# Patient Record
Sex: FEMALE | Race: WHITE | Employment: OTHER | URBAN - METROPOLITAN AREA
[De-identification: names, ages, dates, MRNs, and addresses within clinical notes are randomized per-mention and may not be internally consistent; named-entity substitution may affect disease eponyms.]

---

## 2017-10-02 ENCOUNTER — HOSPITAL ENCOUNTER (OUTPATIENT)
Dept: MAMMOGRAPHY | Age: 72
Discharge: HOME | End: 2017-10-02
Attending: ORTHOPAEDIC SURGERY
Payer: MEDICARE

## 2017-10-02 DIAGNOSIS — M81.0 OSTEOPOROSIS: ICD-10-CM

## 2017-10-02 PROCEDURE — 77080 DXA BONE DENSITY AXIAL: CPT

## 2017-10-20 ENCOUNTER — HOSPITAL ENCOUNTER (OUTPATIENT)
Dept: MAMMOGRAPHY | Age: 72
Discharge: HOME | End: 2017-10-20
Attending: INTERNAL MEDICINE
Payer: MEDICARE

## 2017-10-20 DIAGNOSIS — Z12.31 VISIT FOR SCREENING MAMMOGRAM: ICD-10-CM

## 2017-10-20 PROCEDURE — 77067 SCR MAMMO BI INCL CAD: CPT

## 2018-10-22 ENCOUNTER — HOSPITAL ENCOUNTER (OUTPATIENT)
Dept: MAMMOGRAPHY | Age: 73
Discharge: HOME | End: 2018-10-22
Attending: INTERNAL MEDICINE
Payer: MEDICARE

## 2018-10-22 DIAGNOSIS — Z12.31 VISIT FOR SCREENING MAMMOGRAM: ICD-10-CM

## 2018-10-22 PROCEDURE — 77067 SCR MAMMO BI INCL CAD: CPT

## 2019-07-22 ENCOUNTER — HOSPITAL ENCOUNTER (OUTPATIENT)
Dept: PHYSICAL THERAPY | Age: 74
Discharge: HOME | End: 2019-07-22
Payer: MEDICARE

## 2019-07-22 PROCEDURE — 97161 PT EVAL LOW COMPLEX 20 MIN: CPT

## 2019-07-22 PROCEDURE — 97035 APP MDLTY 1+ULTRASOUND EA 15: CPT

## 2019-07-22 NOTE — THERAPY EVALUATION
[facility]  :   [place]  Secondary: Rhys Hernandez at [facility] [address], [address], [place] [address].  [phone]   [phone]       OUTPATIENT PHYSICAL THERAPY:Initial Assessment 2019   ICD-10: Treatment Diagnosis: Low back pain (M54.5)  Precautions/Allergies:   Adhesive tape; Hydrochlorothiazide; Iodine; Other medication; and Sulfa (sulfonamide antibiotics)   TREATMENT PLAN:  Effective Dates: 2019 TO 2019 (60 days). Frequency/Duration: 2 times a week for 60 Day(s) MEDICAL/REFERRING DIAGNOSIS:  Lumbago with sciatica, unspecified side [M54.40]   DATE OF ONSET: 2019  REFERRING PHYSICIAN: Francy Carrasco MD MD Orders: Evaluate and treat, dry needling  Return MD Appointment: end      INITIAL ASSESSMENT:  Ms. Huntley presents with complaints of lower back pain after slipping and landing on her back in 2019. She presents with pain, pelvic/sacral obliquity, and decreased activity tolerance due to pain. She will benefit from skilled physical therapy to increase functional mobility and help decrease pain. PROBLEM LIST (Impacting functional limitations):  1. Decreased Strength  2. Decreased ADL/Functional Activities  3. Increased Pain  4. Decreased Activity Tolerance INTERVENTIONS PLANNED: (Treatment may consist of any combination of the following)  1. Manual Therapy  2. Therapeutic Exercise/Strengthening   3. Modalities as needed for pain  4. Therapeutic activities     GOALS: (Goals have been discussed and agreed upon with patient.)  Short term goals: Time frame: 3-4 weeks  1. Pt will report pain at worst 4/10.   2. Pt will be independent with HEP. Discharge Goals: Time Frame: 6-8 weeks  1. Pt will report pain 1/10 with daily activities. 2. Pt will score 25% or less on Oswestry.    3. Pt reports she is able to stand for long periods of time to go shopping with no increase in pain. OUTCOME MEASURE:   Tool Used: Modified Oswestry Low Back Pain Questionnaire  Score:  Initial: 28/50  Most Recent: X/50 (Date: -- )   Interpretation of Score: Each section is scored on a 0-5 scale, 5 representing the greatest disability. The scores of each section are added together for a total score of 50. MEDICAL NECESSITY:   · Patient demonstrates good rehab potential due to higher previous functional level. REASON FOR SERVICES/OTHER COMMENTS:  · Patient continues to require skilled intervention due to increased pain and decreased spinal alignment. Total Duration:  PT Patient Time In/Time Out  Time In: 1355  Time Out: 1455  Rehabilitation Potential For Stated Goals: Good  Regarding [address] [place] [address] therapy, I certify that the treatment plan above will be carried out by a therapist or under their direction. Thank you for this referral,    Rex Zhu, PT       Referring Physician Signature: Ambrocio Cali MD _______________________________ Date _____________     PAIN/SUBJECTIVE:   Initial:   2-3/10 Post Session:  2/10   HISTORY:   History of Injury/Illness (Reason for Referral): Was carrying tomatoes and the bag broke and she slipped and landed on her back. She went to emergency care and had x-rays. Had a shot the next day at her primary care doctor. Pain when injured 9-10/10. She has had sciatic pain in the past and has been babying her back since then. No pain that radiates down her back. Pain with rolling in bed. Pain goes across the low back. Sharp pain when it is bad.  7-8/10 with vacumming or mopping the floors. Pain at rest 2-3/10. Unable to stand for long periods of time, difficulty cleaning the floor. Putting hands behind her back helps some. She has had dry needling her upper trap before and it helped.    Past Medical History/Comorbidities:   Ms. Lucio Casiano  has a past medical history of Arm injury (right), Arthritis, Asthma, Cancer of breast ([facility] [address] Dysuria, Environmental allergies, Fall (12/4/12), Gout, Hypercholesteraemia, Hypertension, hypothyroidism, Incomplete bladder emptying, Lobular carcinoma in situ of breast, Menopause, Morbid obesity ([facility] [address] Nausea & vomiting (2010 at [facility]), Neurogenic bladder, NOS, Prediabetes, Unspecified adverse effect of anesthesia (2000), and Urinary tract infection, site not specified. She also has no past medical history of Diabetes ([facility] [address] Difficult intubation, Ill-defined condition, Malignant hyperthermia due to anesthesia, or Pseudocholinesterase deficiency. Ms. Gio Bermudez  has a past surgical history that includes pr breast surgery procedure unlisted; hx hysterectomy; hx heent; hx heent (2011); hx orthopaedic; hx orthopaedic (2010); hx urological; and hx breast lumpectomy (Right). Social History/Living Environment:    Lives with   Prior Level of Function/Work/Activity:  Walking 1-2 miles, silver sneakers at the [facility]. Likes the exercises. Ambulatory/Rehab Services H2 Model Falls Risk Assessment   Risk Factors:       No Risk Factors Identified Ability to Rise from Chair:       (1)  Pushes up, successful in one attempt   Falls Prevention Plan:       No modifications necessary   Total: (5 or greater = High Risk): 1   ©2010 [facility] [employer] All Rights Reserved. [facility] Patent #[id number]. Federal Law prohibits the replication, distribution or use without written permission from [facility] [employer]   Current Medications:       Current Outpatient Medications:     promethazine (PHENERGAN) 25 mg tablet, Take 1 Tab by mouth every six (6) hours as needed. , Disp: 30 Tab, Rfl: 0    HYDROmorphone (DILAUDID) 2 mg tablet, Take 1-2 Tabs by mouth every four (4) hours as needed. Max Daily Amount: 24 mg., Disp: 60 Tab, Rfl: 0    ipratropium (ATROVENT) 0.06 % nasal spray, 1 Spray daily. , Disp: , Rfl:     levothyroxine (SYNTHROID) 125 mcg tablet, Take 125 mcg by mouth every other day., Disp: , Rfl:     levothyroxine (SYNTHROID) 137 mcg tablet, Take 137 mcg by mouth every other day., Disp: , Rfl:     diethylpropion 75 mg TbER, Take  by mouth two (2) times a day. NONFORMULARY, bring to hospital, Disp: , Rfl:     zolpidem (AMBIEN) 10 mg tablet, Take 10 mg by mouth nightly., Disp: , Rfl:     triamcinolone (NASACORT AQ) 55 mcg nasal inhaler, 1 [place] by Both Nostrils route daily. , Disp: , Rfl:     atorvastatin (LIPITOR) 80 mg tablet, Take 80 mg by mouth nightly., Disp: , Rfl:     hydrOXYzine (ATARAX) 25 mg tablet, Take  by mouth three (3) times daily as needed for Itching., Disp: , Rfl:     CALCIUM CARBONATE/VITAMIN D3 (CALCIUM + D PO), Take 1 Tab by mouth two (2) times a day.  , Disp: , Rfl:     POTASSIUM CHLORIDE PO, Take 1 Tab by mouth daily. , Disp: , Rfl:     MAGNESIUM CARBONATE PO, Take 1 Tab by mouth daily. , Disp: , Rfl:     losartan (COZAAR) 100 mg tablet, Take 100 mg by mouth every morning.  , Disp: , Rfl:     metoprolol (LOPRESSOR) 50 mg tablet, Take 50 mg by mouth two (2) times a day. Instructed to take DOS per anesthesia protocol with a sip of water., Disp: , Rfl:     metformin (GLUCOPHAGE) 1,000 mg tablet, Take 1,000 mg by mouth nightly., Disp: , Rfl:     fexofenadine (ALLEGRA) 180 mg tablet, Take  by mouth daily. am , Disp: , Rfl:     cetirizine (ZYRTEC) 10 mg tablet, Take  by mouth nightly., Disp: , Rfl:     multivitamin capsule, Take 1 Cap by mouth daily. , Disp: , Rfl:     ascorbic acid (VITAMIN C) 500 mg tablet, Take 1,000 mg by mouth daily. , Disp: , Rfl:    Date Last Reviewed:  7-22-19   Number of Personal Factors/Comorbidities that affect the Plan of Care: 1-2: MODERATE COMPLEXITY   EXAMINATION:   Observation/Orthostatic Postural Assessment:          Pt arrived to physical therapy in no apparent distress. She sits in chair leaning to the left.    Palpation:          Tender to palpation to PSIS bilateral, tender to L5, tender to R ASIS; R ASIS anterior rotation. ROM:          Test next session  Strength:          Not tested  Special Tests:          Positive pelvic obliquity in supine. Positive sacral forward bend test in sitting with L side does not flex  Decreased quad and hip flexor flexibility  Neurological Screen:        Neural Tension Tests:  Negative slump B  Functional Mobility:         Transfers:  independent        Bed Mobility:  independent   Body Structures Involved:  1. Joints  2. Muscles  3. Ligaments Body Functions Affected:  1. Neuromusculoskeletal Activities and Participation Affected:  1.  Community, Social and [place]   Number of elements (examined above) that affect the Plan of Care: 3: MODERATE COMPLEXITY   CLINICAL PRESENTATION:   Presentation: Stable and uncomplicated: LOW COMPLEXITY   CLINICAL DECISION MAKING:   Use of outcome tool(s) and clinical judgement create a POC that gives a: Clear prediction of patient's progress: LOW COMPLEXITY

## 2019-07-22 NOTE — PROGRESS NOTES
Liya Gomez [place]  :   [place] / Product Type: Medicare /  [address]  at [address] [place] [address] Rd  [address] [facility], [address], [place] [address].  [phone]   [phone]       OUTPATIENT PHYSICAL THERAPY: Daily Treatment Note 2019  Visit Count: 1     ICD-10: Treatment Diagnosis: Low back pain (M54.5)  Precautions/Allergies:   Adhesive tape; Hydrochlorothiazide; Iodine; Other medication; and Sulfa (sulfonamide antibiotics)   MD Orders: Evaluate and treat, dry needling MEDICAL/REFERRING DIAGNOSIS:  Lumbago with sciatica, unspecified side [M54.40]   DATE OF ONSET: 2019  REFERRING PHYSICIAN: Jose A Carrillo MD  RETURN PHYSICIAN APPOINTMENT: end        Pre-treatment Symptoms/Complaints:  Pt reports back pain that increases with prolonged standing or walking  Pain: Initial:   2-3/10 Post Session:  2-3/10   Medications Last Reviewed:  19    Updated Objective Findings:   See evaluation note from today     TREATMENT:   Manual Therapy (5 minutes): tool assisted soft tissue mobilization to L5 multifidus and PSIS in prone. MET for pelvic obliquity. Therapeutic Modalities: ultrasound to low back for pain                                                                            Lumbo-Sacral Spine Ultrasound  Delivery: Continuous  Duty Cycle: 1 %  Frequency: 1.3 mHz  Duration : 8 minutes  Patient Position: Prone                   HEP: pelvic tilts   MedBridge Portal    Treatment/Session Summary:    · Response to Treatment:  no adverse reaction with tool assisted mobilization or ultrasound. · Communication/Consultation:  None today  · Equipment provided today:  None today  · Recommendations/Intent for next treatment session: Next visit will focus on assess pelvic/sacral alignment, modalities as needed for pain. Treatment Plan of Care Effective Dates:  19 to 19.      Frequency/Duration: 2x/week for 60 days        Total Treatment Billable Duration:  13 minutes + evaluation  PT Patient Time In/Time Out  Time In: 0005  Time Out: 2450 Beau Rojas, PT    Future Appointments   Date Time Provider Frances Pressley   7/26/2019 11:15 AM Esequiel Powell, PT [facility]DR[facility]   7/29/2019  1:45 PM Esequiel Powell, PT [facility]   8/1/2019  2:30 PM Juliana Powell, ALEM FLORES [facility]   10/23/2019  9:15 AM DENZEL [facility] ROOM 1 GERSON MAHONEY

## 2019-07-26 ENCOUNTER — HOSPITAL ENCOUNTER (OUTPATIENT)
Dept: PHYSICAL THERAPY | Age: 74
Discharge: HOME | End: 2019-07-26
Payer: MEDICARE

## 2019-07-26 PROCEDURE — 97140 MANUAL THERAPY 1/> REGIONS: CPT

## 2019-07-26 PROCEDURE — 97035 APP MDLTY 1+ULTRASOUND EA 15: CPT

## 2019-07-26 NOTE — PROGRESS NOTES
Jagdish Chowdhury [place]  :   [place] / Product Type: Medicare /  [address] at [facility] ADRIANO CONNORS  1101 [facility], [address], [address] [place] [address]  [phone]   [phone]       OUTPATIENT PHYSICAL THERAPY: Daily Treatment Note 2019  Visit Count: 2     ICD-10: Treatment Diagnosis: Low back pain (M54.5)  Precautions/Allergies:   Adhesive tape; Hydrochlorothiazide; Iodine; Other medication; and Sulfa (sulfonamide antibiotics)   MD Orders: Evaluate and treat, dry needling MEDICAL/REFERRING DIAGNOSIS:  Lumbago with sciatica, unspecified side [M54.40]   DATE OF ONSET: 2019  REFERRING PHYSICIAN: Gilberto Mcrae MD  RETURN PHYSICIAN APPOINTMENT: end        Pre-treatment Symptoms/Complaints:  Pt reports her back pain was a little better. She did do a lot of gardening yesterday and that hurt her back. Pain: Initial:   -3/10 Post Session:  -3/10   Medications Last Reviewed:  19    Updated Objective Findings:   none     TREATMENT:   Manual Therapy (30 minutes): for improved lumbar mobility. Pt prone and central PAs to lumbar spine grade 3. Sacral glides grade 3. MET for pelvic obliquity. Pt in right side lying and pelvis posterior glides. Therapeutic Modalities: ultrasound to low back for pain                                                                            Lumbo-Sacral Spine Ultrasound  Delivery: Continuous  Duty Cycle: 1 %  Frequency: 1.3 mHz  Duration : 8 minutes  Patient Position: Prone                   HEP: pelvic tilts   MedBridge Portal    Treatment/Session Summary:    · Response to Treatment:  Stiffness to R sacrum with glides. · Communication/Consultation:  None today  · Equipment provided today:  None today  · Recommendations/Intent for next treatment session: Next visit will focus on assess pelvic/sacral alignment, modalities as needed for pain.     Treatment Plan of Care Effective Dates:  7-22-19 to 9-20-19.      Frequency/Duration: 2x/week for 60 days        Total Treatment Billable Duration: 38 minutes  PT Patient Time In/Time Out  Time In: 1120  Time Out: 71 Ambrose Harrison, PT    Future Appointments   Date Time Provider Frances Pressley   7/29/2019  1:45 PM Ritika Clifford, PT [facility]   8/1/2019  2:30 PM Juliana Clifford, PT [facility]   10/23/2019  9:15 AM [facility] ROOM 1 [facility] [place]

## 2019-07-29 ENCOUNTER — HOSPITAL ENCOUNTER (OUTPATIENT)
Dept: PHYSICAL THERAPY | Age: 74
Discharge: HOME | End: 2019-07-29
Payer: MEDICARE

## 2019-07-29 PROCEDURE — 97035 APP MDLTY 1+ULTRASOUND EA 15: CPT

## 2019-07-29 PROCEDURE — 97110 THERAPEUTIC EXERCISES: CPT

## 2019-07-29 PROCEDURE — 97140 MANUAL THERAPY 1/> REGIONS: CPT

## 2019-07-29 NOTE — PROGRESS NOTES
Samuel Ward [place]  :   [place] / Product Type: Medicare /  [address] at [facility] ADRIANO CONNORS  1101 [facility], [address], [address] [place] [address]  [phone]   [phone]       OUTPATIENT PHYSICAL THERAPY: Daily Treatment Note 2019  Visit Count: 3     ICD-10: Treatment Diagnosis: Low back pain (M54.5)  Precautions/Allergies:   Adhesive tape; Hydrochlorothiazide; Iodine; Other medication; and Sulfa (sulfonamide antibiotics)   MD Orders: Evaluate and treat, dry needling MEDICAL/REFERRING DIAGNOSIS:  Lumbago with sciatica, unspecified side [M54.40]   DATE OF ONSET: 2019  REFERRING PHYSICIAN: Williams Escoto MD  RETURN PHYSICIAN APPOINTMENT: end        Pre-treatment Symptoms/Complaints:  Pt reports she was working in the yard and helping her  clean up the shrubs. Pain: Initial:   2-3/10 Post Session:  2-3/10   Medications Last Reviewed:  19    Updated Objective Findings:   none     TREATMENT:   Manual Therapy (20 minutes): for improved lumbar mobility. Pt prone and central PAs to lumbar spine grade 3. Sacral glides grade 3. MET for pelvic obliquity. Pt in right side lying and pelvis posterior glides. Therapeutic Exercise: ( 10 minutes):  Exercises to improve mobility. Required moderate visual cues to promote proper body alignment. Pt supine and pelvic titls x 20 reps, bridge 2x10.  Active hamstring stretch with support behind the knee x10 reps B, pt sitting and pelvic tilts in siting x10 reps  Therapeutic Modalities: ultrasound to low back for pain                                                                            Lumbo-Sacral Spine Ultrasound  Delivery: Continuous  Duty Cycle: 1 %  Frequency: 1.3 mHz  Duration : 8 minutes  Patient Position: Prone                   HEP: pelvic tilts   MedBridge Portal    Treatment/Session Summary:    · Response to Treatment: She reported minimal pain with sitting pelvic tilts. No pain with ultrasound or manual treatment. · Communication/Consultation:  None today  · Equipment provided today:  None today  · Recommendations/Intent for next treatment session: Next visit will focus on assess pelvic/sacral alignment, modalities as needed for pain. Treatment Plan of Care Effective Dates:  7-22-19 to 9-20-19.      Frequency/Duration: 2x/week for 60 days        Total Treatment Billable Duration: 38 minutes  PT Patient Time In/Time Out  Time In: 1345  Time Out: [address] [place], PT    Future Appointments   Date Time Provider Frances Pressley   8/1/2019  2:30 PM Blake [facility], [facility]   8/5/2019  2:00 PM Juliana Younger, PT [facility]   8/8/2019  1:45 PM Aviva Younger, PT [facility]   8/12/2019  1:45 PM Juliana Younger, PT [facility]   8/15/2019  1:00 PM Juliana Younger, PT [facility]   10/23/2019  9:15 AM [facility] ROOM 1 [place]ERSON MAHONEY

## 2019-08-01 ENCOUNTER — HOSPITAL ENCOUNTER (OUTPATIENT)
Dept: PHYSICAL THERAPY | Age: 74
Discharge: HOME | End: 2019-08-01
Payer: MEDICARE

## 2019-08-01 PROCEDURE — 97035 APP MDLTY 1+ULTRASOUND EA 15: CPT

## 2019-08-01 PROCEDURE — 97110 THERAPEUTIC EXERCISES: CPT

## 2019-08-01 PROCEDURE — 97140 MANUAL THERAPY 1/> REGIONS: CPT

## 2019-08-01 NOTE — PROGRESS NOTES
Venus Landis [place]  :   [place] / Product Type: Medicare /  [address] at [facility] ADRIANO CONNORS  1101 [facility], [address], [place] [address].  [phone]   [phone]       OUTPATIENT PHYSICAL THERAPY: Daily Treatment Note 2019  Visit Count: 4     ICD-10: Treatment Diagnosis: Low back pain (M54.5)  Precautions/Allergies:   Adhesive tape; Hydrochlorothiazide; Iodine; Other medication; and Sulfa (sulfonamide antibiotics)   MD Orders: Evaluate and treat, dry needling MEDICAL/REFERRING DIAGNOSIS:  Lumbago with sciatica, unspecified side [M54.40]   DATE OF ONSET: 2019  REFERRING PHYSICIAN: Anjel Arias MD  RETURN PHYSICIAN APPOINTMENT: end        Pre-treatment Symptoms/Complaints:  Pt reports she was working in the yard and helping her  clean up the shrubs. Pain: Initial:   -3/10 Post Session:  2-3/10   Medications Last Reviewed:  19    Updated Objective Findings:   none     TREATMENT:   Manual Therapy (20 minutes): for improved lumbar mobility. Tool assisted mobilization to L3-L5 multifidus and Left PSIS. Pt prone and central PAs to lumbar spine grade 3. Sacral glides grade 3. Therapeutic Exercise: ( 10 minutes):  Exercises to improve mobility. Required moderate visual cues to promote proper body alignment. Pt supine and pelvic titls x 20 reps, bridge 2x10.pt sitting and pelvic tilts in siting x10 reps with moderate visual cueing. Therapeutic Modalities: ultrasound to low back for pain                                                                            Lumbo-Sacral Spine Ultrasound  Delivery: Continuous  Duty Cycle: 1 %  Frequency: 1.3 mHz  Duration : 10 minutes  Patient Position: Prone                   HEP: pelvic tilts   MedBridge Portal    Treatment/Session Summary:    · Response to Treatment: Difficulty with sitting pelvic tilts and moving from her pelvis instead of trunk. Improved pelvic alignment today. · Communication/Consultation:  None today  · Equipment provided today:  None today  · Recommendations/Intent for next treatment session: Next visit will focus on assess pelvic/sacral alignment, modalities as needed for pain. Treatment Plan of Care Effective Dates:  7-22-19 to 9-20-19.      Frequency/Duration: 2x/week for 60 days        Total Treatment Billable Duration: 40 minutes  PT Patient Time In/Time Out  Time In: 4600  Time Out: [address], PT    Future Appointments   Date Time Provider Frances Pressley   8/5/2019  2:00 PM Juliana Buckley, PT [facility]   8/8/2019  1:45 PM Rebekah August, PT [facility]   8/12/2019  1:45 PM Zahcary Buckley [facility] [facility], PT [facility]   8/15/2019  1:00 PM Juliana Buckley, PT [facility]   10/23/2019  9:15 AM DENZEL [facility] ROOM 1 [place] MAHONEY

## 2019-08-05 ENCOUNTER — HOSPITAL ENCOUNTER (OUTPATIENT)
Dept: PHYSICAL THERAPY | Age: 74
Discharge: HOME | End: 2019-08-05
Payer: MEDICARE

## 2019-08-05 PROCEDURE — 97140 MANUAL THERAPY 1/> REGIONS: CPT

## 2019-08-05 PROCEDURE — 97035 APP MDLTY 1+ULTRASOUND EA 15: CPT

## 2019-08-05 PROCEDURE — 97110 THERAPEUTIC EXERCISES: CPT

## 2019-08-05 NOTE — PROGRESS NOTES
Ly Sadler [place]  :   [place] / Product Type: Medicare /  [address] [facility]  [address] [facility], [address], [place] [address].  [phone]   [phone]       OUTPATIENT PHYSICAL THERAPY: Daily Treatment Note 2019  Visit Count: 5     ICD-10: Treatment Diagnosis: Low back pain (M54.5)  Precautions/Allergies:   Adhesive tape; Hydrochlorothiazide; Iodine; Other medication; and Sulfa (sulfonamide antibiotics)   MD Orders: Evaluate and treat, dry needling MEDICAL/REFERRING DIAGNOSIS:  Lumbago with sciatica, unspecified side [M54.40]   DATE OF ONSET: 2019  REFERRING PHYSICIAN: Denita Alejo MD  RETURN PHYSICIAN APPOINTMENT: end        Pre-treatment Symptoms/Complaints:  Pt reports she tried shifting her weight and propping one foot up while standing the kitchen and that seemed to help. Pain: Initial:   -3/10 Post Session:  2-3/10   Medications Last Reviewed:  19    Updated Objective Findings:   none     TREATMENT:   Manual Therapy (20 minutes): for improved lumbar mobility. Pt prone and central PAs to lumbar spine grade 3. Sacral glides grade 3. Pt in side lying and right pelvis rotation glides. Pt in side lying and hip extension stretch B 20\"x3 ea. Therapeutic Exercise: ( 10 minutes):  Exercises to improve mobility. Required moderate visual cues to promote proper body alignment. Pt supine and pelvic titls x 20 reps, bridge 2x10.pt sitting and pelvic tilts in siting x10 reps with moderate visual cueing and tactile cueing.    Therapeutic Modalities: ultrasound to low back for pain                                                                            Lumbo-Sacral Spine Ultrasound  Delivery: Continuous  Duty Cycle: 1 %  Frequency: 1.3 mHz  Duration : 10 minutes  Patient Position: Prone                   HEP: pelvic tilts   MedBridge Portal    Treatment/Session Summary:    · Response to Treatment: she continued to have difficulty with sitting pelvic tilts but did better with minimal tactile cueing on pelvis. · Communication/Consultation:  None today  · Equipment provided today:  None today  · Recommendations/Intent for next treatment session: Next visit will focus on assess pelvic/sacral alignment, modalities as needed for pain. Treatment Plan of Care Effective Dates:  7-22-19 to 9-20-19.      Frequency/Duration: 2x/week for 60 days        Total Treatment Billable Duration: 40 minutes  PT Patient Time In/Time Out  Time In: 3327  Time Out: [facility], PT    Future Appointments   Date Time Provider Frances Pressley   8/8/2019  1:45 PM J Luis Arechiga, PT [facility]   8/12/2019  1:45 PM J Luis Arechiga, PT [facility]   8/15/2019  1:00 PM Juliana Arechiga, PT [facility]   10/23/2019  9:15 AM DENZEL [facility] ROOM 1 GERSON MAHONEY

## 2019-08-08 ENCOUNTER — HOSPITAL ENCOUNTER (OUTPATIENT)
Dept: PHYSICAL THERAPY | Age: 74
Discharge: HOME | End: 2019-08-08
Payer: MEDICARE

## 2019-08-08 PROCEDURE — 97110 THERAPEUTIC EXERCISES: CPT

## 2019-08-08 PROCEDURE — 97140 MANUAL THERAPY 1/> REGIONS: CPT

## 2019-08-08 PROCEDURE — 97035 APP MDLTY 1+ULTRASOUND EA 15: CPT

## 2019-08-08 NOTE — PROGRESS NOTES
Venus Landis [place]  :   [place] / [place] / Product Type: Medicare /  [address] at [facility] ADRIANO CONNORS  [address] [facility], [address], [place] [address].  [phone]   [phone]       OUTPATIENT PHYSICAL THERAPY: Daily Treatment Note 2019  Visit Count: 6     ICD-10: Treatment Diagnosis: Low back pain (M54.5)  Precautions/Allergies:   Adhesive tape; Hydrochlorothiazide; Iodine; Other medication; and Sulfa (sulfonamide antibiotics)   MD Orders: Evaluate and treat, dry needling MEDICAL/REFERRING DIAGNOSIS:  Lumbago with sciatica, unspecified side [M54.40]   DATE OF ONSET: 2019  REFERRING PHYSICIAN: Anjel Arias MD  RETURN PHYSICIAN APPOINTMENT: end        Pre-treatment Symptoms/Complaints:  Pt reports she was more sore in her back yesterday and could not figure out why. Pain: Initial:   -3/10 Post Session:  2-3/10   Medications Last Reviewed:  19    Updated Objective Findings:   L ASIS posterior rotation     TREATMENT:   Manual Therapy (20 minutes): for improved lumbar mobility. Pt prone and central PAs to lumbar spine grade 3. Sacral glides grade 3. Pt in side lying and right pelvis rotation glides. MET for pelvic obliquity. Therapeutic Exercise: ( 10 minutes):  Exercises to improve mobility. Required moderate visual cues to promote proper body alignment. Pt supine and pelvic titls x 20 reps, bridge 2x10. Supine marching 10x. Instructed in self correction for pelvic obliquity. Therapeutic Modalities: ultrasound to low back for pain                                                                            Lumbo-Sacral Spine Ultrasound  Delivery: Continuous  Duty Cycle: 1 %  Frequency: 1.2 mHz  Duration : 10 minutes  Patient Position: Prone                   HEP: pelvic tilts   [employer] Portal    Treatment/Session Summary:    · Response to Treatment: Improved pelvic alignment after MET. · Communication/Consultation:  None today  · Equipment provided today:  None today  · Recommendations/Intent for next treatment session: Next visit will focus on assess pelvic/sacral alignment, modalities as needed for pain. Treatment Plan of Care Effective Dates:  7-22-19 to 9-20-19.      Frequency/Duration: 2x/week for 60 days        Total Treatment Billable Duration: 40 minutes  PT Patient Time In/Time Out  Time In: 1350  Time Out: [address], PT    Future Appointments   Date Time Provider Frances Pressley   8/12/2019  1:45 PM Tony Skinner, PT [facility]   8/15/2019  1:00 PM Juliana Skinner, PT [facility]HID [facility]   10/23/2019  9:15 AM DENZEL [facility] ROOM 1 [place]

## 2019-08-12 ENCOUNTER — HOSPITAL ENCOUNTER (OUTPATIENT)
Dept: PHYSICAL THERAPY | Age: 74
Discharge: HOME | End: 2019-08-12
Payer: MEDICARE

## 2019-08-12 PROCEDURE — 97110 THERAPEUTIC EXERCISES: CPT

## 2019-08-12 PROCEDURE — 97035 APP MDLTY 1+ULTRASOUND EA 15: CPT

## 2019-08-12 PROCEDURE — 97140 MANUAL THERAPY 1/> REGIONS: CPT

## 2019-08-12 NOTE — PROGRESS NOTES
Karissa Mondragon [place]  :   [place] / Product Type: Medicare /  [address] at [facility] ADRIANO CONNORS  [address] [place] [address], [address], [place] [address].  [phone]   [phone]       OUTPATIENT PHYSICAL THERAPY: Daily Treatment Note 2019  Visit Count: 7     ICD-10: Treatment Diagnosis: Low back pain (M54.5)  Precautions/Allergies:   Adhesive tape; Hydrochlorothiazide; Iodine; Other medication; and Sulfa (sulfonamide antibiotics)   MD Orders: Evaluate and treat, dry needling MEDICAL/REFERRING DIAGNOSIS:  Lumbago with sciatica, unspecified side [M54.40]   DATE OF ONSET: 2019  REFERRING PHYSICIAN: Ambrocio Childers MD  RETURN PHYSICIAN APPOINTMENT: end        Pre-treatment Symptoms/Complaints:  Pt reports her back felt better over the weekend. Pain: Initial:   -3/10 Post Session:  -3/10   Medications Last Reviewed:  19    Updated Objective Findings:   L ASIS posterior rotation     TREATMENT:   Manual Therapy (20 minutes): for improved lumbar mobility. Pt prone and central PAs to lumbar spine grade 3. Sacral glides grade 3. Therapeutic Exercise: ( 10 minutes):  Exercises to improve mobility. Required moderate visual cues to promote proper body alignment. Pt supine and pelvic titls x 20 reps, Supine marching 10x. Instructed in self correction for pelvic obliquity. Added single knee to chest 20\" bilateral and active hamstring stretch 3\"x10 ea.   Therapeutic Modalities: ultrasound to low back for pain                                                                            Lumbo-Sacral Spine Ultrasound  Delivery: Continuous  Duty Cycle: 1 %  Frequency: 1.2 mHz  Duration : 10 minutes  Patient Position: Prone                   HEP: issued HEP with lumbar stretch and self correction for pelvic obliquity  [employer] Portal    Treatment/Session Summary:    · Response to Treatment: she continues to progress with decreased pain in low back. She may be ready for discharge soon. · Communication/Consultation:  None today  · Equipment provided today:  None today  · Recommendations/Intent for next treatment session: Next visit will focus on assess pelvic/sacral alignment, modalities as needed for pain. Treatment Plan of Care Effective Dates:  7-22-19 to 9-20-19.      Frequency/Duration: 2x/week for 60 days        Total Treatment Billable Duration: 40 minutes  PT Patient Time In/Time Out  Time In: 1345  Time Out: [address], PT    Future Appointments   Date Time Provider Frances Pressley   8/15/2019  1:00 PM [facility]   10/23/2019  9:15 AM SFE [facility] ROOM 1 [place]

## 2019-08-15 ENCOUNTER — HOSPITAL ENCOUNTER (OUTPATIENT)
Dept: PHYSICAL THERAPY | Age: 74
Discharge: HOME | End: 2019-08-15
Payer: MEDICARE

## 2019-08-15 PROCEDURE — 97110 THERAPEUTIC EXERCISES: CPT

## 2019-08-15 PROCEDURE — 97035 APP MDLTY 1+ULTRASOUND EA 15: CPT

## 2019-08-15 NOTE — PROGRESS NOTES
Kelvin Nelson [place]  : [id number]  [place] / Product Type: Medicare /  [address] [facility], [address], [place] [address].  [phone]   [phone]       OUTPATIENT PHYSICAL THERAPY: Daily Treatment Note 8/15/2019  Visit Count: 8     ICD-10: Treatment Diagnosis: Low back pain (M54.5)  Precautions/Allergies:   Adhesive tape; Hydrochlorothiazide; Iodine; Other medication; and Sulfa (sulfonamide antibiotics)   MD Orders: Evaluate and treat, dry needling MEDICAL/REFERRING DIAGNOSIS:  Lumbago with sciatica, unspecified side [M54.40]   DATE OF ONSET: 2019  REFERRING PHYSICIAN: Sindy Padilla MD  RETURN PHYSICIAN APPOINTMENT: end        Pre-treatment Symptoms/Complaints:  Pt reports she vacuumed after therapy the other day and had a little pain in her back. Pain: Initial:   2-3/10 Post Session:  2-3/10   Medications Last Reviewed:  19    Updated Objective Findings:   Level pelvis. TREATMENT:   Manual Therapy (5 minutes): for improved lumbar mobility. Pt prone and central PAs to lumbar spine grade 3. Sacral glides grade 3. Therapeutic Exercise: ( 10 minutes):  Exercises to improve mobility. Required moderate visual cues to promote proper body alignment. Pt supine and pelvic titls x 20 reps, Supine marching 10x. Reviewed single knee to chest 20\" bilateral and active hamstring stretch 3\"x10 ea. Therapeutic Modalities: ultrasound to low back for pain                                                                            Lumbo-Sacral Spine Ultrasound  Delivery: Continuous  Duty Cycle: 1 %  Frequency: 1.2 mHz  Duration : 10 minutes  Patient Position: Prone                   HEP: issued HEP with lumbar stretch and self correction for pelvic obliquity  [employer] Portal    Treatment/Session Summary:    · Response to Treatment: Improved pelvis alignment today.  Reviewed HEP and she is to work on those and return to therapy as needed. · Communication/Consultation:  None today  · Equipment provided today:  None today  · Recommendations/Intent for next treatment session: Next visit will focus on assess pelvic/sacral alignment, modalities as needed for pain. Treatment Plan of Care Effective Dates:  7-22-19 to 9-20-19.      Frequency/Duration: 2x/week for 60 days        Total Treatment Billable Duration: 25 minutes  PT Patient Time In/Time Out  Time In: 1300  Time Out: [address] [facility] [facility] [address], PT    Future Appointments   Date Time Provider Frances Pressley   10/23/2019  9:15 AM SFE [facility]

## 2019-10-23 ENCOUNTER — HOSPITAL ENCOUNTER (OUTPATIENT)
Dept: MAMMOGRAPHY | Age: 74
Discharge: HOME | End: 2019-10-23
Attending: INTERNAL MEDICINE
Payer: MEDICARE

## 2019-10-23 DIAGNOSIS — Z12.31 VISIT FOR SCREENING MAMMOGRAM: ICD-10-CM

## 2019-10-23 PROCEDURE — 77067 SCR MAMMO BI INCL CAD: CPT

## 2019-12-30 NOTE — THERAPY DISCHARGE
Jeffry Huntley  :   [place]  Secondary: Rhys Hernandez at [facility] [place] CONNORS  [address] [facility], [address] [place] [address],[address], [facility] [address].  [phone]   [phone]       OUTPATIENT PHYSICAL THERAPY:Discharge Summary 8/15/2019   ICD-10: Treatment Diagnosis: Low back pain (M54.5)  Precautions/Allergies:   Adhesive tape; Hydrochlorothiazide; Iodine; Other medication; and Sulfa (sulfonamide antibiotics)   TREATMENT PLAN:  Discharge patient from physical therapy. MEDICAL/REFERRING DIAGNOSIS:  Lumbago with sciatica, unspecified side [M54.40]   DATE OF ONSET: 2019  REFERRING PHYSICIAN: Peter Torres MD MD Orders: Evaluate and treat, dry needling  Return MD Appointment: end      Joon Toledo has been seen in physical therapy from 19 to 8-15-19 for 8 visits. Treatment has been discontinued at this time due to patient failing to return for additional treatment. The below goals were met prior to discontinuation. Thank you for this referral.          GOALS: (Goals have been discussed and agreed upon with patient.)  Short term goals: Time frame: 3-4 weeks  1. Pt will report pain at worst 4/10. GOAL MET  2. Pt will be independent with HEP. GOAL MET  Discharge Goals: Time Frame: 6-8 weeks  1. Pt will report pain 1/10 with daily activities. Progressed towards  2. Pt will score 25% or less on Oswestry. Progressed towards  3. Pt reports she is able to stand for long periods of time to go shopping with no increase in pain.  Progressed towards    Rehabilitation Potential For Stated Goals: Good    Thank you for this referral,    Juliana Cherry, PT

## 2020-06-26 ENCOUNTER — HOSPITAL ENCOUNTER (OUTPATIENT)
Dept: LAB | Age: 75
Discharge: HOME | End: 2020-06-26

## 2020-06-26 PROCEDURE — 88305 TISSUE EXAM BY PATHOLOGIST: CPT

## 2020-10-27 ENCOUNTER — APPOINTMENT (OUTPATIENT)
Dept: MAMMOGRAPHY | Age: 75
End: 2020-10-27
Attending: INTERNAL MEDICINE
Payer: MEDICARE

## 2020-10-27 ENCOUNTER — HOSPITAL ENCOUNTER (OUTPATIENT)
Dept: MAMMOGRAPHY | Age: 75
Discharge: HOME | End: 2020-10-27
Payer: MEDICARE

## 2020-10-27 DIAGNOSIS — Z12.31 VISIT FOR SCREENING MAMMOGRAM: ICD-10-CM

## 2020-10-27 PROCEDURE — 77067 SCR MAMMO BI INCL CAD: CPT

## 2021-08-23 ENCOUNTER — TRANSCRIBE ORDER (OUTPATIENT)
Dept: SCHEDULING | Age: 76
End: 2021-08-23

## 2021-08-23 DIAGNOSIS — K76.9 LIVER DISEASE, UNSPECIFIED: Primary | ICD-10-CM

## 2021-08-23 DIAGNOSIS — N28.9 DISORDER OF KIDNEY AND URETER: ICD-10-CM

## 2021-08-23 DIAGNOSIS — R93.5 ABNORMAL FINDINGS ON DIAGNOSTIC IMAGING OF OTHER ABDOMINAL REGIONS, INCLUDING RETROPERITONEUM: ICD-10-CM

## 2021-09-29 ENCOUNTER — TRANSCRIBE ORDER (OUTPATIENT)
Dept: SCHEDULING | Age: 76
End: 2021-09-29

## 2021-09-29 DIAGNOSIS — Z12.31 VISIT FOR SCREENING MAMMOGRAM: Primary | ICD-10-CM

## 2021-11-01 ENCOUNTER — HOSPITAL ENCOUNTER (OUTPATIENT)
Dept: MAMMOGRAPHY | Age: 76
Discharge: HOME | End: 2021-11-01
Payer: MEDICARE

## 2021-11-01 DIAGNOSIS — Z12.31 VISIT FOR SCREENING MAMMOGRAM: ICD-10-CM

## 2021-11-01 PROCEDURE — 77067 SCR MAMMO BI INCL CAD: CPT

## 2022-09-14 ENCOUNTER — HOSPITAL ENCOUNTER (OUTPATIENT)
Dept: LAB | Age: 77
Discharge: HOME | End: 2022-09-17

## 2022-09-14 PROCEDURE — 88305 TISSUE EXAM BY PATHOLOGIST: CPT

## 2022-11-02 ENCOUNTER — HOSPITAL ENCOUNTER (OUTPATIENT)
Dept: MAMMOGRAPHY | Age: 77
Discharge: HOME | End: 2022-11-05
Payer: MEDICARE

## 2022-11-02 DIAGNOSIS — Z12.31 SCREENING MAMMOGRAM FOR BREAST CANCER: ICD-10-CM

## 2022-11-02 PROCEDURE — 77067 SCR MAMMO BI INCL CAD: CPT

## 2023-11-02 ENCOUNTER — TRANSCRIBE ORDERS (OUTPATIENT)
Dept: SCHEDULING | Age: 78
End: 2023-11-02

## 2023-11-02 DIAGNOSIS — Z12.31 SCREENING MAMMOGRAM FOR HIGH-RISK PATIENT: Primary | ICD-10-CM

## 2023-11-10 ENCOUNTER — HOSPITAL ENCOUNTER (OUTPATIENT)
Dept: MAMMOGRAPHY | Age: 78
End: 2023-11-10
Attending: INTERNAL MEDICINE
Payer: MEDICARE

## 2023-11-10 VITALS — HEIGHT: 65 IN | BODY MASS INDEX: 30.32 KG/M2 | WEIGHT: 182 LBS

## 2023-11-10 DIAGNOSIS — Z12.31 SCREENING MAMMOGRAM FOR HIGH-RISK PATIENT: ICD-10-CM

## 2023-11-10 PROCEDURE — 77067 SCR MAMMO BI INCL CAD: CPT

## 2024-12-18 ENCOUNTER — TRANSCRIBE ORDERS (OUTPATIENT)
Dept: SCHEDULING | Age: 79
End: 2024-12-18

## 2024-12-18 DIAGNOSIS — Z12.31 OTHER SCREENING MAMMOGRAM: Primary | ICD-10-CM

## 2025-01-08 ENCOUNTER — HOSPITAL ENCOUNTER (EMERGENCY)
Age: 80
Discharge: HOME | End: 2025-01-08
Payer: MEDICARE

## 2025-01-08 ENCOUNTER — APPOINTMENT (OUTPATIENT)
Dept: GENERAL RADIOLOGY | Age: 80
End: 2025-01-08
Payer: MEDICARE

## 2025-01-08 ENCOUNTER — APPOINTMENT (OUTPATIENT)
Dept: CT IMAGING | Age: 80
End: 2025-01-08
Payer: MEDICARE

## 2025-01-08 VITALS
OXYGEN SATURATION: 98 % | RESPIRATION RATE: 18 BRPM | SYSTOLIC BLOOD PRESSURE: 168 MMHG | WEIGHT: 180 LBS | TEMPERATURE: 97.9 F | HEIGHT: 65 IN | DIASTOLIC BLOOD PRESSURE: 90 MMHG | BODY MASS INDEX: 29.99 KG/M2 | HEART RATE: 80 BPM

## 2025-01-08 DIAGNOSIS — S09.90XA INJURY OF HEAD, INITIAL ENCOUNTER: ICD-10-CM

## 2025-01-08 DIAGNOSIS — S01.81XA FACIAL LACERATION, INITIAL ENCOUNTER: ICD-10-CM

## 2025-01-08 DIAGNOSIS — S02.30XB: Primary | ICD-10-CM
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
## 2025-01-08 PROCEDURE — 6360000002 HC RX W HCPCS: Performed by: STUDENT IN AN ORGANIZED HEALTH CARE EDUCATION/TRAINING PROGRAM

## 2025-01-08 PROCEDURE — 72125 CT NECK SPINE W/O DYE: CPT

## 2025-01-08 PROCEDURE — 99284 EMERGENCY DEPT VISIT MOD MDM: CPT

## 2025-01-08 PROCEDURE — 6370000000 HC RX 637 (ALT 250 FOR IP): Performed by: STUDENT IN AN ORGANIZED HEALTH CARE EDUCATION/TRAINING PROGRAM

## 2025-01-08 PROCEDURE — 90471 IMMUNIZATION ADMIN: CPT | Performed by: STUDENT IN AN ORGANIZED HEALTH CARE EDUCATION/TRAINING PROGRAM

## 2025-01-08 PROCEDURE — 12013 RPR F/E/E/N/L/M 2.6-5.0 CM: CPT

## 2025-01-08 PROCEDURE — 70450 CT HEAD/BRAIN W/O DYE: CPT

## 2025-01-08 PROCEDURE — 73562 X-RAY EXAM OF KNEE 3: CPT

## 2025-01-08 PROCEDURE — 90714 TD VACC NO PRESV 7 YRS+ IM: CPT | Performed by: STUDENT IN AN ORGANIZED HEALTH CARE EDUCATION/TRAINING PROGRAM

## 2025-01-08 RX ORDER — HYDROCODONE BITARTRATE AND ACETAMINOPHEN 5; 325 MG/1; MG/1
1 TABLET ORAL EVERY 6 HOURS PRN
Qty: 12 TABLET | Refills: 0 | Status: SHIPPED | OUTPATIENT
Start: 2025-01-08 | End: 2025-01-11

## 2025-01-08 RX ORDER — ONDANSETRON 4 MG/1
4 TABLET, ORALLY DISINTEGRATING ORAL
Status: COMPLETED | OUTPATIENT
Start: 2025-01-08 | End: 2025-01-08

## 2025-01-08 RX ORDER — LIDOCAINE HYDROCHLORIDE AND EPINEPHRINE 10; 10 MG/ML; UG/ML
20 INJECTION, SOLUTION INFILTRATION; PERINEURAL
Status: COMPLETED | OUTPATIENT
Start: 2025-01-08 | End: 2025-01-08

## 2025-01-08 RX ORDER — HYDROCODONE BITARTRATE AND ACETAMINOPHEN 5; 325 MG/1; MG/1
1 TABLET ORAL EVERY 6 HOURS PRN
Qty: 12 TABLET | Refills: 0 | Status: SHIPPED | OUTPATIENT
Start: 2025-01-08 | End: 2025-01-08

## 2025-01-08 RX ORDER — ONDANSETRON 4 MG/1
4 TABLET, FILM COATED ORAL 3 TIMES DAILY PRN
Qty: 15 TABLET | Refills: 0 | Status: SHIPPED | OUTPATIENT
Start: 2025-01-08 | End: 2025-01-08

## 2025-01-08 RX ORDER — ONDANSETRON 4 MG/1
4 TABLET, FILM COATED ORAL 3 TIMES DAILY PRN
Qty: 15 TABLET | Refills: 0 | Status: SHIPPED | OUTPATIENT
Start: 2025-01-08

## 2025-01-08 RX ORDER — OXYCODONE HYDROCHLORIDE 5 MG/1
5 TABLET ORAL
Status: COMPLETED | OUTPATIENT
Start: 2025-01-08 | End: 2025-01-08

## 2025-01-08 RX ADMIN — OXYCODONE 5 MG: 5 TABLET ORAL at 17:39

## 2025-01-08 RX ADMIN — ONDANSETRON 4 MG: 4 TABLET, ORALLY DISINTEGRATING ORAL at 17:39

## 2025-01-08 RX ADMIN — LIDOCAINE HYDROCHLORIDE,EPINEPHRINE BITARTRATE 20 ML: 10; .01 INJECTION, SOLUTION INFILTRATION; PERINEURAL at 16:14

## 2025-01-08 RX ADMIN — CLOSTRIDIUM TETANI TOXOID ANTIGEN (FORMALDEHYDE INACTIVATED) AND CORYNEBACTERIUM DIPHTHERIAE TOXOID ANTIGEN (FORMALDEHYDE INACTIVATED) 0.5 ML: 5; 2 INJECTION, SUSPENSION INTRAMUSCULAR at 16:14

## 2025-01-08 ASSESSMENT — PAIN - FUNCTIONAL ASSESSMENT: PAIN_FUNCTIONAL_ASSESSMENT: 0-10

## 2025-01-08 ASSESSMENT — ENCOUNTER SYMPTOMS
ABDOMINAL PAIN: 0
FACIAL SWELLING: 0
TROUBLE SWALLOWING: 0
PHOTOPHOBIA: 0
COUGH: 0
VOMITING: 0
SHORTNESS OF BREATH: 0

## 2025-01-08 ASSESSMENT — PAIN DESCRIPTION - LOCATION: LOCATION: FACE

## 2025-01-08 ASSESSMENT — PAIN SCALES - GENERAL: PAINLEVEL_OUTOF10: 4

## 2025-01-08 ASSESSMENT — PAIN DESCRIPTION - PAIN TYPE: TYPE: ACUTE PAIN

## 2025-01-08 ASSESSMENT — PAIN DESCRIPTION - ORIENTATION: ORIENTATION: RIGHT

## 2025-01-08 NOTE — ED TRIAGE NOTES
Patient fell going to doctor's office here at [facility]. Patient has laceration above and below right eye. Hematoma noted above right eye. Active bleeding noted. Patient denies blood thinners, only take 81mg ASA.

## 2025-01-08 NOTE — ED PROVIDER NOTES
Unknown (3/19/2021)    Received from [facility]    Social Connections     Frequency of Communication with Friends and Family: Not asked     Frequency of Social Gatherings with Friends and Family: Not asked   Intimate Partner Violence: Unknown (3/19/2021)    Received from [facility]    Intimate Partner Violence     Fear of Current or Ex-Partner: Not asked     Emotionally Abused: Not asked     Physically Abused: Not asked     Sexually Abused: Not asked   Housing Stability: Not At Risk (3/9/2022)    Received from [facility]    Housing Stability     Was there a time when you did not have a steady place to sleep: Not asked     Worried that the place you are staying is making you sick: Not asked        Discharge Medication List as of 1/8/2025  5:36 PM        CONTINUE these medications which have NOT CHANGED    Details   MAGNESIUM CARBONATE PO Take 1 tablet by mouth dailyHistorical Med      POTASSIUM CHLORIDE PO Take 1 tablet by mouth dailyHistorical Med      ascorbic acid (VITAMIN C) 500 MG tablet Take 1,000 mg by mouth dailyHistorical Med      atorvastatin (LIPITOR) 80 MG tablet Take 80 mg by mouthHistorical Med      cetirizine (ZYRTEC) 10 MG tablet Take by mouthHistorical Med      Diethylpropion HCl CR 75 MG TB24 Take by mouth 2 times daily.Historical Med      fexofenadine (ALLEGRA) 180 MG tablet Take by mouth dailyHistorical Med      HYDROmorphone (DILAUDID) 2 MG tablet Take 2-4 mg by mouth every 4 hours as needed.Historical Med      hydrOXYzine (ATARAX) 25 MG tablet Take by mouth 3 times daily as neededHistorical Med      ipratropium (ATROVENT) 0.06 % nasal spray 1 spray dailyHistorical Med      !! levothyroxine (SYNTHROID) 125 MCG tablet Take 125 mcg by mouth every other dayHistorical Med      !! levothyroxine (SYNTHROID) 137 MCG tablet Take 137 mcg by mouth every other dayHistorical Med      losartan (COZAAR) 100 MG tablet Take 100 mg by mouthHistorical Med      metFORMIN (GLUCOPHAGE) 1000 MG

## 2025-01-08 NOTE — ED NOTES
Patient mobility status  with no difficulty.     I have reviewed discharge instructions with the patient.  The patient verbalized understanding.    Patient left ED via Discharge Method: ambulatory to Home with spouse.    Opportunity for questions and clarification provided.     Patient given 3 scripts.

## 2025-01-08 NOTE — DISCHARGE INSTRUCTIONS
Return here in 5 days for suture removal.  Take antibiotics for full course.  Use norco for pain and zofran for nausea.  Referrals have been placed to ENT and ophthalmology. They should call you to set appointments up.  Ideally, please make an appointment with oral maxillofacial surgeon.  Return here for new or worsening symptoms otherwise.    Risk of opioid analgesics include, but are not limited to: Overdosing can stop or slow your breathing and lead to death; fractures from falls; drowsiness leading to injury; tolerance, dependence and addiction. You should not operate any motorized vehicles or work from a height greater than ground level when taking opioid analgesics as they increase your fall risks. Do not combine these medications with any other opioid, to include street opioids such as heroin. Do not combine these medications with benzodiazepines like Xanax or alcohol as this may cause severe respiratory depression and death.    As we discussed, I did not find a life threatening cause of your symptoms today. However, THAT DOES NOT MEAN IT COULD NOT DEVELOP. If you develop ANY new or worsening symptoms, it is critical that you return for re-evaluation. This includes any symptoms that are concerning to you, especially symptoms such as fevers, visual change, double vision, severe headache.  If you do not return for re-evaluation, you risk serious complications, including death.

## 2025-01-13 ENCOUNTER — HOSPITAL ENCOUNTER (EMERGENCY)
Age: 80
Discharge: HOME | End: 2025-01-13

## 2025-01-13 VITALS
SYSTOLIC BLOOD PRESSURE: 148 MMHG | RESPIRATION RATE: 18 BRPM | OXYGEN SATURATION: 97 % | WEIGHT: 180 LBS | DIASTOLIC BLOOD PRESSURE: 74 MMHG | HEART RATE: 75 BPM | BODY MASS INDEX: 29.99 KG/M2 | HEIGHT: 65 IN | TEMPERATURE: 98 F

## 2025-01-13 DIAGNOSIS — Z48.02 VISIT FOR SUTURE REMOVAL: Primary | ICD-10-CM

## 2025-01-13 ASSESSMENT — LIFESTYLE VARIABLES
HOW OFTEN DO YOU HAVE A DRINK CONTAINING ALCOHOL: NEVER
HOW MANY STANDARD DRINKS CONTAINING ALCOHOL DO YOU HAVE ON A TYPICAL DAY: PATIENT DOES NOT DRINK

## 2025-01-13 ASSESSMENT — PAIN SCALES - GENERAL: PAINLEVEL_OUTOF10: 0

## 2025-01-13 ASSESSMENT — ENCOUNTER SYMPTOMS
GASTROINTESTINAL NEGATIVE: 1
RESPIRATORY NEGATIVE: 1

## 2025-01-13 ASSESSMENT — PAIN - FUNCTIONAL ASSESSMENT: PAIN_FUNCTIONAL_ASSESSMENT: 0-10

## 2025-01-13 NOTE — ED TRIAGE NOTES
Patient to triage with c/o needing 5 sutures removed from face. Pt has 3 above right eye and 2 on right cheek.

## 2025-01-14 NOTE — ED PROVIDER NOTES
Emergency Department Provider Note       PCP: Sotero Benitez MD   Age: 79 y.o.   Sex: female     DISPOSITION Decision To Discharge 01/13/2025 01:43:46 PM    ICD-10-CM    1. Visit for suture removal  Z48.02           Medical Decision Making     79-year-old female presents for suture removal.  Wound has healed nicely.  Sutures removed without difficulty.  Patient tolerated well.  Recommended she follow-up with ophthalmologist at [facility] as scheduled for her orbital floor fracture and return if worsening in any way.     1 or more acute illnesses that pose a threat to life or bodily function.   Patient was discharged risks and benefits of hospitalization were considered.  Diagnosis or care significantly limited by social determinants of health  .  I independently ordered and reviewed each unique test.                         History     79-year-old female presents for suture removal.  She was seen 1 week ago after a fall and was diagnosed with open orbital floor fracture.  She was started on antibiotics and referred to specialist for follow-up.  5 sutures were placed.  Her wounds appear to be healing well.  She denies any acute complaints.        ROS     Review of Systems   Constitutional: Negative.    Respiratory: Negative.     Cardiovascular: Negative.    Gastrointestinal: Negative.    Genitourinary: Negative.    Skin:         Here for suture removal   All other systems reviewed and are negative.       Physical Exam     Vitals signs and nursing note reviewed:  Vitals:    01/13/25 1211   BP: (!) 148/74   Pulse: 75   Resp: 18   Temp: 98 °F (36.7 °C)   TempSrc: Oral   SpO2: 97%   Weight: 81.6 kg (180 lb)   Height: 1.651 m (5' 5\")      Physical Exam  Vitals and nursing note reviewed.   Constitutional:       General: She is not in acute distress.     Appearance: She is well-developed and normal weight. She is not ill-appearing or toxic-appearing.   HENT:      Head: Normocephalic.      Comments: Bruising can have

## 2025-02-15 ENCOUNTER — HOSPITAL ENCOUNTER (OUTPATIENT)
Dept: MAMMOGRAPHY | Age: 80
Discharge: HOME | End: 2025-02-18
Attending: INTERNAL MEDICINE
Payer: MEDICARE

## 2025-02-15 DIAGNOSIS — Z12.31 OTHER SCREENING MAMMOGRAM: ICD-10-CM

## 2025-02-15 PROCEDURE — 77067 SCR MAMMO BI INCL CAD: CPT
